# Patient Record
(demographics unavailable — no encounter records)

---

## 2024-11-14 NOTE — PHYSICAL EXAM
[JVD] : no jugular venous distention  [Normal Breath Sounds] : Normal breath sounds [No Rash or Lesion] : No rash or lesion [Alert] : alert [Calm] : calm [de-identified] : Soft, nontender, nondistended. Well healed lower midline laparotomy scar without appreciable ventral hernia on valsvalva. [de-identified] : Thin appearing elderly female in NAD [de-identified] : MMM [de-identified] : No lower extremity edema.

## 2024-11-14 NOTE — PHYSICAL EXAM
[JVD] : no jugular venous distention  [Normal Breath Sounds] : Normal breath sounds [No Rash or Lesion] : No rash or lesion [Alert] : alert [Calm] : calm [de-identified] : Soft, nontender, nondistended. Well healed lower midline laparotomy scar without appreciable ventral hernia on valsvalva. [de-identified] : Thin appearing elderly female in NAD [de-identified] : MMM [de-identified] : No lower extremity edema.

## 2024-11-14 NOTE — HISTORY OF PRESENT ILLNESS
[FreeTextEntry1] : 75F PMH COPD, HTN, recently s/p ALIF ([place]) who developed a postoperative SBO and submassive PE. She responded well to AC however had persistent partial obstructive symptomatology and ultimately underwent an open abdominal exploration and was found to have a chronically incarcerated left retroperitoneal internal hernia. For which, the hernia contents were reduced and ultimately resected. Primary anastomosis was performed, and she tolerated the procedure well. Reported losing significant amount of weight perioperatively. Patient advised need for more frequent high calorie meals throughout the day. Continue AC and follow up with vascular sugary and PCP for continued VTE management and consideration for IVC filter retrieval. Patient presents today for one month follow up. Has regained 10lbs, able to eat better, and mobility is improving. Continues to c/o chronic back pain.   PMH: COPD, HTN, SBO, PE PSHx: Lumbar spinal fusion ([place]), open SBR, IVC Filter Medications: Nifedipine 30 qd, Citalopram 30 qd, Oxycodone 10 (post op pain), Eliquis Allergies: NA Social Hx: Prior smoker >20 years ago, denies alcohol/rec drug use Last colonoscopy: NA Last EGD: NA

## 2024-11-14 NOTE — HISTORY OF PRESENT ILLNESS
[FreeTextEntry1] : 75F PMH COPD, HTN, recently s/p ALIF ([place]) who developed a postoperative SBO and submassive PE. She responded well to AC however had persistent partial obstructive symptomatology and ultimately underwent an open abdominal exploration and was found to have a chronically incarcerated left retroperitoneal internal hernia. For which, the hernia contents were reduced and ultimately resected. Primary anastomosis was performed, and she tolerated the procedure well. Reported losing significant amount of weight perioperatively. Patient advised need for more frequent high calorie meals throughout the day. Continue AC and follow up with vascular sugary and PCP for continued VTE management and consideration for IVC filter retrieval. Patient presents today for one month follow up. Has regained 10lbs, able to eat better, and mobility is improving. Continues to c/o chronic back pain.   PMH: COPD, HTN, SBO, PE PSHx: Lumbar spinal fusion ([place]), open SBR, IVC Filter Medications: Nifedipine 30 qd, Citalopram 30 qd, Oxycodone 10 (post op pain), Eliquis Allergies: NA Social Hx: Prior smoker >20 years ago, denies alcohol/rec drug use Last colonoscopy: NA Last EGD: NA

## 2024-11-14 NOTE — ASSESSMENT
[FreeTextEntry1] : 76F with a recent ALIF complicated by PE and SBO 2/2 internal hernia now s/p IVC filter placement, AC, and open internal hernia reduction and SBR, continuing to recover well.

## 2024-11-14 NOTE — PLAN
[TextEntry] : - Referred to primary care for ongoing primary management as pt has not seen a primary physician in quite some time. - Referred back to vascular surgery for evaluation of IVC filter and possible withdrawal. - Pt has an appointment with orthopedics to eval chronic back pain.  - RTC in 2-3 months for re-evaluation.

## 2024-11-14 NOTE — PHYSICAL EXAM
[JVD] : no jugular venous distention  [Normal Breath Sounds] : Normal breath sounds [No Rash or Lesion] : No rash or lesion [Alert] : alert [Calm] : calm [de-identified] : Soft, nontender, nondistended. Well healed lower midline laparotomy scar without appreciable ventral hernia on valsvalva. [de-identified] : Thin appearing elderly female in NAD [de-identified] : MMM [de-identified] : No lower extremity edema.

## 2024-11-14 NOTE — PHYSICAL EXAM
[JVD] : no jugular venous distention  [Normal Breath Sounds] : Normal breath sounds [No Rash or Lesion] : No rash or lesion [Alert] : alert [Calm] : calm [de-identified] : Soft, nontender, nondistended. Well healed lower midline laparotomy scar without appreciable ventral hernia on valsvalva. [de-identified] : Thin appearing elderly female in NAD [de-identified] : MMM [de-identified] : No lower extremity edema.

## 2024-11-14 NOTE — HISTORY OF PRESENT ILLNESS
[FreeTextEntry1] : 75F PMH COPD, HTN, recently s/p ALIF ([place]) who developed a postoperative SBO and submassive PE. She responded well to AC however had persistent partial obstructive symptomatology and ultimately underwent an open abdominal exploration and was found to have a chronically incarcerated left retroperitoneal internal hernia. For which, the hernia contents were reduced and ultimately resected. Primary anastomosis was performed, and she tolerated the procedure well. Reported losing significant amount of weight perioperatively. Patient advised need for more frequent high calorie meals throughout the day. Continue AC and follow up with vascular sugary and PCP for continued VTE management and consideration for IVC filter retrieval. Patient presents today for one month follow up. Has regained 10lbs, able to eat better, and mobility is improving. Continues to c/o chronic back pain.   PMH: COPD, HTN, SBO, PE PSHx: Lumbar spinal fusion ([place]), open SBR, IVC Filter Medications: Nifedipine 30 qd, Citalopram 30 qd, Oxycodone 10 (post op pain), Eliquis Allergies: NA Social Hx: Prior smoker >20 years ago, denies alcohol/rec drug use Last colonoscopy: NA Last EGD: NA

## 2024-11-14 NOTE — HISTORY OF PRESENT ILLNESS
[FreeTextEntry1] : 75F PMH COPD, HTN, recently s/p ALIF ([place]) who developed a postoperative SBO and submassive PE. She responded well to AC however had persistent partial obstructive symptomatology and ultimately underwent an open abdominal exploration and was found to have a chronically incarcerated left retroperitoneal internal hernia. For which, the hernia contents were reduced and ultimately resected. Primary anastomosis was performed, and she tolerated the procedure well. Reported losing significant amount of weight perioperatively. Patient advised need for more frequent high calorie meals throughout the day. Continue AC and follow up with vascular sugary and PCP for continued VTE management and consideration for IVC filter retrieval. Patient presents today for one month follow up. Has regained 10lbs, able to eat better, and mobility is improving. Continues to c/o chronic back pain.   PMH: COPD, HTN, SBO, PE PSHx: Lumbar spinal fusion ([place]), open SBR, IVC Filter Medications: Nifedipine 30 qd, Citalopram 30 qd, Oxycodone 10 (post op pain), Eliquis Allergies: NA Social Hx: Prior smoker >20 years ago, denies alcohol/rec drug use Last colonoscopy: NA Last EGD: NA

## 2024-11-26 NOTE — HISTORY OF PRESENT ILLNESS
[de-identified] : 76-year-old female presents as new patient for evaluation status post spinal surgery.  On May 9, 2024 she underwent a lumbar procedure with Dr. Lawler at [facility].  No operative reports available for this procedure.  She states that the postoperative recovery process was complicated by small bowel obstruction that required surgery after presenting to the emergency department at [facility].  She also developed a DVT for which an IVC filter was placed.  She states that she was informed this may have been due to a peritoneal injury at the time of the lumbar spine procedure.  She has been largely unable to follow-up for recommended postoperative visits and care including rehab given this complication and surgical recovery which required a stay in a nursing home.  She describes ongoing back pain and a sense of stiffness in the low back to make significant difficulty change position.  Also describes ongoing pain associated numbness tingling and weakness affecting the bilateral legs.  This seems to be worse on the left than the right side.

## 2024-11-26 NOTE — DISCUSSION/SUMMARY
[de-identified] : I reviewed the available imaging with the patient as well as my impression of her surgical procedure underwent as well as the recovery thus far.  She had several relevant questions with regards to her ongoing rehabilitation and symptoms in the setting of ongoing back pain and lower extremity symptoms.  I have recommended ongoing rehabilitation in the form of physical therapy as well as an MRI of the lumbar spine to assess for any ongoing stenosis that may be contributing to her lower extremity symptoms.  I have also encouraged her to seek follow-up with her treating surgeon as he is of course much more familiar with her care and the procedure thus far.  Regards to her IVC filter she is requested follow-up with vascular surgery to consider removal.  We will follow-up after the MRI is complete  I have spent greater than 60 minutes preparing to see the patient, collecting relevant history, performing a thorough history and physical examination, counseling the patient regarding my findings ordering the appropriate therapies and tests, communicating with other relevant healthcare professionals, documenting my encounter and coordinating care.

## 2024-11-26 NOTE — ASSESSMENT
[FreeTextEntry1] : 76-year-old female status post L2 to ilium anterior posterior fusion with decompression performed at an outside facility May 9, 2024 postoperative course complicated by small bowel obstruction status post resection as well as DVT

## 2024-11-26 NOTE — DISCUSSION/SUMMARY
[de-identified] : I reviewed the available imaging with the patient as well as my impression of her surgical procedure underwent as well as the recovery thus far.  She had several relevant questions with regards to her ongoing rehabilitation and symptoms in the setting of ongoing back pain and lower extremity symptoms.  I have recommended ongoing rehabilitation in the form of physical therapy as well as an MRI of the lumbar spine to assess for any ongoing stenosis that may be contributing to her lower extremity symptoms.  I have also encouraged her to seek follow-up with her treating surgeon as he is of course much more familiar with her care and the procedure thus far.  Regards to her IVC filter she is requested follow-up with vascular surgery to consider removal.  We will follow-up after the MRI is complete  I have spent greater than 60 minutes preparing to see the patient, collecting relevant history, performing a thorough history and physical examination, counseling the patient regarding my findings ordering the appropriate therapies and tests, communicating with other relevant healthcare professionals, documenting my encounter and coordinating care.

## 2024-11-26 NOTE — HISTORY OF PRESENT ILLNESS
[de-identified] : 76-year-old female presents as new patient for evaluation status post spinal surgery.  On May 9, 2024 she underwent a lumbar procedure with Dr. Lawler at [facility].  No operative reports available for this procedure.  She states that the postoperative recovery process was complicated by small bowel obstruction that required surgery after presenting to the emergency department at [facility] [place].  She also developed a DVT for which an IVC filter was placed.  She states that she was informed this may have been due to a peritoneal injury at the time of the lumbar spine procedure.  She has been largely unable to follow-up for recommended postoperative visits and care including rehab given this complication and surgical recovery which required a stay in a nursing home.  She describes ongoing back pain and a sense of stiffness in the low back to make significant difficulty change position.  Also describes ongoing pain associated numbness tingling and weakness affecting the bilateral legs.  This seems to be worse on the left than the right side.

## 2024-11-26 NOTE — PHYSICAL EXAM
[Antalgic] : antalgic [Walker] : ambulates with walker [Normal DTR Reflexes] : DTR reflexes normal [Normal Proprioception] : sensation intact for proprioception [Normal] : Oriented to person, place, and time, insight and judgement were intact and the affect was normal [de-identified] : Globally 4+ out of 5 in the bilateral lower extremities [de-identified] : AP lateral scoliosis x-ray PACS 11/25/2024 CT lumbar spine [facility] July 2024 Status post L2 to ilium instrumented spinal fusion with L4-5 and L5-S1 ALIF.  Global sagittal and coronal alignment parameters are appropriate and all hardware appears to be an appropriately placed position Laminectomies performed at L2-3 L3-4 L4-5 and L5-S1

## 2024-11-26 NOTE — PHYSICAL EXAM
[Antalgic] : antalgic [Walker] : ambulates with walker [Normal DTR Reflexes] : DTR reflexes normal [Normal Proprioception] : sensation intact for proprioception [Normal] : Oriented to person, place, and time, insight and judgement were intact and the affect was normal [de-identified] : Globally 4+ out of 5 in the bilateral lower extremities [de-identified] : AP lateral scoliosis x-ray PACS 11/25/2024 CT lumbar spine [facility] July 2024 Status post L2 to ilium instrumented spinal fusion with L4-5 and L5-S1 ALIF.  Global sagittal and coronal alignment parameters are appropriate and all hardware appears to be an appropriately placed position Laminectomies performed at L2-3 L3-4 L4-5 and L5-S1

## 2024-12-18 NOTE — HISTORY OF PRESENT ILLNESS
[FreeTextEntry1] : 76yoF with COPD (not on home O2), HTN, anxiety/depression who was admitted on 7/5/24 for management of SBO and bilateral pulmonary embolism. Patient was not a candidate for anticoagulation at that time and vascular surgery were asked to place IVC filter that was done on 7/10/24. Today she returns for a follow up and to discuss a removal of IVCF.   Venous doppler 7/6/24:  Deep venous thrombosis of a RIGHT peroneal vein. Deep venous thrombosis of the LEFT common femoral and deep femoral veins

## 2024-12-18 NOTE — ASSESSMENT
[FreeTextEntry1] : 76yoF who presented with bilateral PE and venous thromboembolism with need for undergoing a laparotomy with General Surgery.  Given her history and relative contraindication to anticoagulation in the perioperative period, she was advised to undergo placement of an IVC filter that was done 7/10/24.  Today she returns to discuss a removal of IVCF and to f/u on her DVTs.

## 2024-12-18 NOTE — ADDENDUM
[FreeTextEntry1] : This note was written by Florence BANKS, acting as a scribe for Dr. Fredrick Woodall.  I, Dr. Fredrick Woodall, have read and attest that all the information, medical decision-making, and discharge instructions within are true and accurate.  I, Dr. Fredrick Woodall, personally performed the evaluation and management (E/M) services for this new patient.  That E/M includes conducting the initial examination, assessing all conditions, and establishing the plan of care.  Today, my ACP, Florence BANKS, was here to observe my evaluation and management services for this patient to be followed going forward.

## 2024-12-18 NOTE — PHYSICAL EXAM
[Respiratory Effort] : normal respiratory effort [Normal Heart Sounds] : normal heart sounds [2+] : left 2+ [Abdomen Tenderness] : ~T ~M No abdominal tenderness [Alert] : alert [Calm] : calm [de-identified] : WN/WD, NAD [de-identified] : NC/AT [de-identified] : supple [de-identified] : FROM

## 2025-01-30 NOTE — HISTORY OF PRESENT ILLNESS
[FreeTextEntry1] : 76yoF with COPD (not on home O2), HTN, anxiety/depression who was admitted on 7/5/24 for management of SBO and bilateral pulmonary embolism. Patient was not a candidate for anticoagulation at that time and vascular surgery were asked to place IVC filter that was done on 7/10/24. Today she returns for a follow up and to discuss a removal of IVCF. She is on Eliquis.   Venous doppler 7/6/24:  Deep venous thrombosis of a RIGHT peroneal vein. Deep venous thrombosis of the LEFT common femoral and deep femoral veins

## 2025-01-31 NOTE — ASSESSMENT
[FreeTextEntry1] : 76yoF who presented with bilateral PE and venous thromboembolism with need for undergoing a laparotomy with General Surgery.  Given her history and relative contraindication to anticoagulation in the perioperative period, she was advised to undergo placement of an IVC filter that was done 7/10/24.  Today she returns to discuss a removal of IVCF and to f/u on her DVTs. [Arterial/Venous Disease] : arterial/venous disease

## 2025-01-31 NOTE — PHYSICAL EXAM
[Abdomen Tenderness] : ~T ~M No abdominal tenderness [de-identified] : WN/WD, NAD [de-identified] : supple [de-identified] : NC/AT [de-identified] : FROM [2+] : left 2+ [No Rash or Lesion] : No rash or lesion

## 2025-01-31 NOTE — PHYSICAL EXAM
[Abdomen Tenderness] : ~T ~M No abdominal tenderness [de-identified] : WN/WD, NAD [de-identified] : supple [de-identified] : NC/AT [de-identified] : FROM [2+] : left 2+ [No Rash or Lesion] : No rash or lesion

## 2025-01-31 NOTE — PHYSICAL EXAM
[Abdomen Tenderness] : ~T ~M No abdominal tenderness [de-identified] : WN/WD, NAD [de-identified] : NC/AT [de-identified] : supple [de-identified] : FROM [2+] : left 2+ [No Rash or Lesion] : No rash or lesion

## 2025-01-31 NOTE — PHYSICAL EXAM
[Abdomen Tenderness] : ~T ~M No abdominal tenderness [de-identified] : WN/WD, NAD [de-identified] : NC/AT [de-identified] : supple [de-identified] : FROM [2+] : left 2+ [No Rash or Lesion] : No rash or lesion

## 2025-02-16 NOTE — PHYSICAL EXAM
[de-identified] : AP lateral scoliosis x-ray PACS 11/25/2024 CT lumbar spine [facility] July 2024 Status post L2 to ilium instrumented spinal fusion with L4-5 and L5-S1 ALIF.  Global sagittal and coronal alignment parameters are appropriate and all hardware appears to be an appropriately placed position Laminectomies performed at L2-3 L3-4 L4-5 and L5-S1  MRI lumbar spine 12/20/2024 [facility] Status post L2 to ilium instrumented fusion.  Prior decompression L3-4 and L4-5 without residual central stenosis.  Within the limits of the MRI scan with metal artifact that does not appear to be a significant ongoing foraminal stenosis

## 2025-02-16 NOTE — DISCUSSION/SUMMARY
[de-identified] : I reviewed the imaging via phone call today with the patient.  I do not find any radiographic evidence of ongoing nerve compression or correlate with her ongoing symptoms.  I do feel that this may be muscular given the extent of the spinal reconstruction in general and have recommended a course of physical therapy as able with her present illness to treat the pain in the legs as well as ongoing follow-up with her treating surgeon as previously recommended.  She will keep us informed her progress   I have spent greater than 20 minutes preparing to see the patient, collecting relevant history, performing a thorough history and physical examination, counseling the patient regarding my findings ordering the appropriate therapies and tests, communicating with other relevant healthcare professionals, documenting my encounter and coordinating care.

## 2025-02-16 NOTE — HISTORY OF PRESENT ILLNESS
[de-identified] : I spoke to the patient on the phone to review the results of the recently completed lumbar spine MRI.  She had a recent bout of the flu for several weeks has been very ill.  Still has ongoing pain in the low back with occasional cramping aches in the Hamstrings

## 2025-07-28 NOTE — ASSESSMENT
[FreeTextEntry1] : 76 YO F with ACC/AHA Stage C NICM (LVIDd 5cm, LVEF 25%),HTN, COPD (Former tobacco user), PE 7/2024 s/p IVC filter and on eliquis, spinal fusion surgery, and SBO s/p ex-lap/JESS/SBR, who presented with dyspnea and found to have COPD exacerbation with new LV dysfunction. Presents to cardiomyopathy clinic for further evaluation.   She is NYHA Class III but euvolemic/ hypovolemic on exam with marginal blood pressures. I suspect deconditioning and series of surgeries is contributing largely to her poor functional status.   # Acute systolic heart failure - Etiology: NICM, unclear TIC? , would discuss further pursuit of additional imaging  - GDMT: current regimen includes Entresto 49/51 mg BID, spironolactone 25 mg daily, metoprolol succinate 25 mg and Farxiga 10 mg daily. Today given her marginal pressures and weakness, I asked her to HOLD her evening dose of Entresto and tomorrow if her SBP >95 mmHg, she can RESUME Entresto at a lower dose of 24/26 mg BID.  - Diuretics: none. Encourage po hydration up to but not more than 2 L daily. Sent BP cuff to [facility] to allow for home monitoring  - Device: premature - Labs at discharge 7/9: Na 141, K 3.9, BUN 28, Cr 0.85, proBNP 5839 (on admission) - Repeat Labs 7/14: Na 146, K 4.4, BUN 18, Cr 0.76, proBNP now 1304 - Other: cardiac rehab deferred , will discuss at follow up  # COPD  - Following w/ pulm at [facility]/ Dr. England - Pulse Ox 98% on RA  #Fungal mycetoma.  -diagnosed with Mycetoma on CT chest in 4/2025.  -Repeat CT chest in 05/2025 showed a mild increase in the cavitary lesion. 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  - Pt evaluated by her Pulmonologists and ID.  - Given stability of lesion on interval CT scans- No antifungals were started. No need for Bronchoscopy given no new symptoms    #History of pulmonary embolus (PE).  -Hx B/L PE summer 2024, on Eliquis, s/p ivc filter which was removed in April 2025.  - AC: Eliquis reduced from 5mg BID to prophylactic 2.5mg BID as per vasc - CTA negative of PE.  #Hypotension/ weakness - Relatively asymptomatic  - changes as above - H/H on discharge 14.7/ 46.6    Please follow up in person with HF ACP team given hypotension. And plan for subsequent visits to be via telephone. Will plan to ultimately see Dr. Oliva in 3 months on max doses of GDMT.

## 2025-07-28 NOTE — ASSESSMENT
[FreeTextEntry1] : 78 YO F with ACC/AHA Stage C NICM (LVIDd 5cm, LVEF 25%),HTN, COPD (Former tobacco user), PE 7/2024 s/p IVC filter and on eliquis, spinal fusion surgery, and SBO s/p ex-lap/JESS/SBR, who presented with dyspnea and found to have COPD exacerbation with new LV dysfunction. Presents to cardiomyopathy clinic for further evaluation.   She is NYHA Class III but euvolemic/ hypovolemic on exam with marginal blood pressures. I suspect deconditioning and series of surgeries is contributing largely to her poor functional status.   # Acute systolic heart failure - Etiology: NICM, unclear TIC? , would discuss further pursuit of additional imaging  - GDMT: current regimen includes Entresto 49/51 mg BID, spironolactone 25 mg daily, metoprolol succinate 25 mg and Farxiga 10 mg daily. Today given her marginal pressures and weakness, I asked her to HOLD her evening dose of Entresto and tomorrow if her SBP >95 mmHg, she can RESUME Entresto at a lower dose of 24/26 mg BID.  - Diuretics: none. Encourage po hydration up to but not more than 2 L daily. Sent BP cuff to [facility] to allow for home monitoring  - Device: premature - Labs at discharge 7/9: Na 141, K 3.9, BUN 28, Cr 0.85, proBNP 5839 (on admission) - Repeat Labs 7/14: Na 146, K 4.4, BUN 18, Cr 0.76, proBNP now 1304 - Other: cardiac rehab deferred , will discuss at follow up  # COPD  - Following w/ pulm at [facility]/ Dr. England - Pulse Ox 98% on RA  #Fungal mycetoma.  -diagnosed with Mycetoma on CT chest in 4/2025.  -Repeat CT chest in 05/2025 showed a mild increase in the cavitary lesion. 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  - Pt evaluated by her Pulmonologists and ID.  - Given stability of lesion on interval CT scans- No antifungals were started. No need for Bronchoscopy given no new symptoms    #History of pulmonary embolus (PE).  -Hx B/L PE summer 2024, on Eliquis, s/p ivc filter which was removed in April 2025.  - AC: Eliquis reduced from 5mg BID to prophylactic 2.5mg BID as per vasc - CTA negative of PE.  #Hypotension/ weakness - Relatively asymptomatic  - changes as above - H/H on discharge 14.7/ 46.6    Please follow up in person with HF ACP team given hypotension. And plan for subsequent visits to be via telephone. Will plan to ultimately see Dr. Oliva in 3 months on max doses of GDMT.

## 2025-07-28 NOTE — REVIEW OF SYSTEMS
[FreeTextEntry1] :  The remaining 14-point ROS is otherwise negative or non contributory except as noted in the HPI.

## 2025-07-28 NOTE — CARDIOLOGY SUMMARY
[de-identified] :  EKG: SR with T wave inversions in anterior leads   [de-identified] :  Pharm Stress MPI 3/2/2023: EKG stress no ST changes, nuclear stress with moederate sized, mild severity, predominantly reversible inferior defect  [de-identified] : TTE 7/2025: LV 5.0 cm, LVEF 25% with global hypokinesis, normal RV size/function, LVOT VTI 14 cm, no significant valvular abnormalities, unable to measure PASP, RAP ~ 8mmHg   [de-identified] : CCTA 7/8/25: Ca score 0, normal coronary arteries, minimal aortic root calcification    REVIEW OF STUDIES EKG: SR with T wave inversions in anterior leads  TTE 7/2025: LV 5.0 cm, LVEF 25% with global hypokinesis, normal RV size/function, LVOT VTI 14 cm, no significant valvular abnormalities, unable to measure PASP, RAP ~ 8mmHg  CCTA 7/8/25: Ca score 0, normal coronary arteries, minimal aortic root calcification  Pharm Stress MPI 3/2/2023: EKG stress no ST changes, nuclear stress with moederate sized, mild severity, predominantly reversible inferior defect   REVIEW OF STUDIES EKG: SR with T wave inversions in anterior leads  TTE 7/2025: LV 5.0 cm, LVEF 25% with global hypokinesis, normal RV size/function, LVOT VTI 14 cm, no significant valvular abnormalities, unable to measure PASP, RAP ~ 8mmHg  CCTA 7/8/25: Ca score 0, normal coronary arteries, minimal aortic root calcification  Pharm Stress MPI 3/2/2023: EKG stress no ST changes, nuclear stress with moederate sized, mild severity, predominantly reversible inferior defect

## 2025-07-28 NOTE — PHYSICAL EXAM
[No Xanthelasma] : no xanthelasma [Normal] : soft, non-tender, no masses/organomegaly, normal bowel sounds [Normal Gait] : normal gait [No Edema] : no edema [No Cyanosis] : no cyanosis [No Clubbing] : no clubbing [Moves all extremities] : moves all extremities [Alert and Oriented] : alert and oriented [de-identified] : thin appearing  [de-identified] : MMM, no perioral cyanosis  [de-identified] : JVP < 6 cm of H2O [de-identified] : using a walker for distance  [de-identified] : warm peripherally

## 2025-07-28 NOTE — ASSESSMENT
[FreeTextEntry1] : 76 YO F with ACC/AHA Stage C NICM (LVIDd 5cm, LVEF 25%),HTN, COPD (Former tobacco user), PE 7/2024 s/p IVC filter and on eliquis, spinal fusion surgery, and SBO s/p ex-lap/JESS/SBR, who presented with dyspnea and found to have COPD exacerbation with new LV dysfunction. Presents to cardiomyopathy clinic for further evaluation.   She is NYHA Class III but euvolemic/ hypovolemic on exam with marginal blood pressures. I suspect deconditioning and series of surgeries is contributing largely to her poor functional status.   # Acute systolic heart failure - Etiology: NICM, unclear TIC? , would discuss further pursuit of additional imaging  - GDMT: current regimen includes Entresto 49/51 mg BID, spironolactone 25 mg daily, metoprolol succinate 25 mg and Farxiga 10 mg daily. Today given her marginal pressures and weakness, I asked her to HOLD her evening dose of Entresto and tomorrow if her SBP >95 mmHg, she can RESUME Entresto at a lower dose of 24/26 mg BID.  - Diuretics: none. Encourage po hydration up to but not more than 2 L daily. Sent BP cuff to [facility] to allow for home monitoring  - Device: premature - Labs at discharge 7/9: Na 141, K 3.9, BUN 28, Cr 0.85, proBNP 5839 (on admission) - Repeat Labs 7/14: Na 146, K 4.4, BUN 18, Cr 0.76, proBNP now 1304 - Other: cardiac rehab deferred , will discuss at follow up  # COPD  - Following w/ pulm at [facility]/ Dr. England - Pulse Ox 98% on RA  #Fungal mycetoma.  -diagnosed with Mycetoma on CT chest in 4/2025.  -Repeat CT chest in 05/2025 showed a mild increase in the cavitary lesion. 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  - Pt evaluated by her Pulmonologists and ID.  - Given stability of lesion on interval CT scans- No antifungals were started. No need for Bronchoscopy given no new symptoms    #History of pulmonary embolus (PE).  -Hx B/L PE summer 2024, on Eliquis, s/p ivc filter which was removed in April 2025.  - AC: Eliquis reduced from 5mg BID to prophylactic 2.5mg BID as per vasc - CTA negative of PE.  #Hypotension/ weakness - Relatively asymptomatic  - changes as above - H/H on discharge 14.7/ 46.6    Please follow up in person with HF ACP team given hypotension. And plan for subsequent visits to be via telephone. Will plan to ultimately see Dr. Oliva in 3 months on max doses of GDMT.

## 2025-07-28 NOTE — PHYSICAL EXAM
[No Xanthelasma] : no xanthelasma [Normal] : soft, non-tender, no masses/organomegaly, normal bowel sounds [Normal Gait] : normal gait [No Edema] : no edema [No Cyanosis] : no cyanosis [No Clubbing] : no clubbing [Moves all extremities] : moves all extremities [Alert and Oriented] : alert and oriented [de-identified] : thin appearing  [de-identified] : MMM, no perioral cyanosis  [de-identified] : JVP < 6 cm of H2O [de-identified] : using a walker for distance  [de-identified] : warm peripherally

## 2025-07-28 NOTE — HISTORY OF PRESENT ILLNESS
[FreeTextEntry1] : HF- Dr. Oliva   76 YO F with ACC/AHA Stage C NICM (LVIDd 5cm, LVEF 25%),HTN, COPD (Former tobacco user), PE 7/2024 s/p IVC filter and on eliquis, spinal fusion surgery, and SBO s/p ex-lap/JESS/SBR, who presented with dyspnea and found to have COPD exacerbation with new LV dysfunction. Presents to cardiomyopathy clinic for further evaluation.   Admitted to [facility] 7/2-7/9/2025. Presenting with worsening shortness of breath. Initial evaluation by CTPE negative but showed new 1.8cm irregular nodular opacity but deemed stable by pulmonology. TTE confirmed newly reduced TTE w/ LVEF 25% and subsequent ischemic evaluation by CCTA was normal.   She lives on the [facility], her son brought her to her appt today. She is using a walker today since her spinal fusion for balance. While she did have pre-existing hypertension prior to this admission, she felt it was well controlled on amlodipine, but since her series of admissions has noted elevated heart rates over the course of the year.  Overall, she reports she is very fatigued and her appetite poor. She estimates she can walk 5 blocks before fatiguing, not short of breath. Sometimes the hot weather, as its been unusually warm in [place] can affect her breathing but has some relief from her MDI. She sleeps with 2-3 pillows for years for her "COPD" and has never been able to lie flat, no reports of PND. And also takes Ambien to help sleep Maintenace. She has no recent LE.  Appetite is poor and plans to start ensure,  but admits to early prandial fullness and nausea without vomiting. She estimates seh drinks < 1 L. No hx of carpel tunnel.

## 2025-07-28 NOTE — PHYSICAL EXAM
[No Xanthelasma] : no xanthelasma [Normal] : soft, non-tender, no masses/organomegaly, normal bowel sounds [Normal Gait] : normal gait [No Edema] : no edema [No Cyanosis] : no cyanosis [No Clubbing] : no clubbing [Moves all extremities] : moves all extremities [Alert and Oriented] : alert and oriented [de-identified] : thin appearing  [de-identified] : MMM, no perioral cyanosis  [de-identified] : JVP < 6 cm of H2O [de-identified] : using a walker for distance  [de-identified] : warm peripherally

## 2025-07-28 NOTE — CARDIOLOGY SUMMARY
[de-identified] :  EKG: SR with T wave inversions in anterior leads   [de-identified] :  Pharm Stress MPI 3/2/2023: EKG stress no ST changes, nuclear stress with moederate sized, mild severity, predominantly reversible inferior defect  [de-identified] : TTE 7/2025: LV 5.0 cm, LVEF 25% with global hypokinesis, normal RV size/function, LVOT VTI 14 cm, no significant valvular abnormalities, unable to measure PASP, RAP ~ 8mmHg   [de-identified] : CCTA 7/8/25: Ca score 0, normal coronary arteries, minimal aortic root calcification    REVIEW OF STUDIES EKG: SR with T wave inversions in anterior leads  TTE 7/2025: LV 5.0 cm, LVEF 25% with global hypokinesis, normal RV size/function, LVOT VTI 14 cm, no significant valvular abnormalities, unable to measure PASP, RAP ~ 8mmHg  CCTA 7/8/25: Ca score 0, normal coronary arteries, minimal aortic root calcification  Pharm Stress MPI 3/2/2023: EKG stress no ST changes, nuclear stress with moederate sized, mild severity, predominantly reversible inferior defect   REVIEW OF STUDIES EKG: SR with T wave inversions in anterior leads  TTE 7/2025: LV 5.0 cm, LVEF 25% with global hypokinesis, normal RV size/function, LVOT VTI 14 cm, no significant valvular abnormalities, unable to measure PASP, RAP ~ 8mmHg  CCTA 7/8/25: Ca score 0, normal coronary arteries, minimal aortic root calcification  Pharm Stress MPI 3/2/2023: EKG stress no ST changes, nuclear stress with moederate sized, mild severity, predominantly reversible inferior defect

## 2025-07-28 NOTE — CARDIOLOGY SUMMARY
[de-identified] :  EKG: SR with T wave inversions in anterior leads   [de-identified] :  Pharm Stress MPI 3/2/2023: EKG stress no ST changes, nuclear stress with moederate sized, mild severity, predominantly reversible inferior defect  [de-identified] : TTE 7/2025: LV 5.0 cm, LVEF 25% with global hypokinesis, normal RV size/function, LVOT VTI 14 cm, no significant valvular abnormalities, unable to measure PASP, RAP ~ 8mmHg   [de-identified] : CCTA 7/8/25: Ca score 0, normal coronary arteries, minimal aortic root calcification    REVIEW OF STUDIES EKG: SR with T wave inversions in anterior leads  TTE 7/2025: LV 5.0 cm, LVEF 25% with global hypokinesis, normal RV size/function, LVOT VTI 14 cm, no significant valvular abnormalities, unable to measure PASP, RAP ~ 8mmHg  CCTA 7/8/25: Ca score 0, normal coronary arteries, minimal aortic root calcification  Pharm Stress MPI 3/2/2023: EKG stress no ST changes, nuclear stress with moederate sized, mild severity, predominantly reversible inferior defect   REVIEW OF STUDIES EKG: SR with T wave inversions in anterior leads  TTE 7/2025: LV 5.0 cm, LVEF 25% with global hypokinesis, normal RV size/function, LVOT VTI 14 cm, no significant valvular abnormalities, unable to measure PASP, RAP ~ 8mmHg  CCTA 7/8/25: Ca score 0, normal coronary arteries, minimal aortic root calcification  Pharm Stress MPI 3/2/2023: EKG stress no ST changes, nuclear stress with moederate sized, mild severity, predominantly reversible inferior defect

## 2025-07-28 NOTE — HISTORY OF PRESENT ILLNESS
[FreeTextEntry1] : HF- Dr. Oliva   78 YO F with ACC/AHA Stage C NICM (LVIDd 5cm, LVEF 25%),HTN, COPD (Former tobacco user), PE 7/2024 s/p IVC filter and on eliquis, spinal fusion surgery, and SBO s/p ex-lap/JESS/SBR, who presented with dyspnea and found to have COPD exacerbation with new LV dysfunction. Presents to cardiomyopathy clinic for further evaluation.   Admitted to [facility] 7/2-7/9/2025. Presenting with worsening shortness of breath. Initial evaluation by CTPE negative but showed new 1.8cm irregular nodular opacity but deemed stable by pulmonology. TTE confirmed newly reduced TTE w/ LVEF 25% and subsequent ischemic evaluation by CCTA was normal.   She lives on the [facility], her son brought her to her appt today. She is using a walker today since her spinal fusion for balance. While she did have pre-existing hypertension prior to this admission, she felt it was well controlled on amlodipine, but since her series of admissions has noted elevated heart rates over the course of the year.  Overall, she reports she is very fatigued and her appetite poor. She estimates she can walk 5 blocks before fatiguing, not short of breath. Sometimes the hot weather, as its been unusually warm in [place] can affect her breathing but has some relief from her MDI. She sleeps with 2-3 pillows for years for her "COPD" and has never been able to lie flat, no reports of PND. And also takes Ambien to help sleep Maintenace. She has no recent LE.  Appetite is poor and plans to start ensure,  but admits to early prandial fullness and nausea without vomiting. She estimates seh drinks < 1 L. No hx of carpel tunnel.

## 2025-07-28 NOTE — FAMILY HISTORY
[TextEntry] : Maternal Grandmother: CHF 80s Sister: CAD,  65 Mother: Ovarian CA  Brother: CAD s/p Stent  Children: Healthy (1 son)

## 2025-07-28 NOTE — HISTORY OF PRESENT ILLNESS
[FreeTextEntry1] : HF- Dr. Oliva   78 YO F with ACC/AHA Stage C NICM (LVIDd 5cm, LVEF 25%),HTN, COPD (Former tobacco user), PE 7/2024 s/p IVC filter and on eliquis, spinal fusion surgery, and SBO s/p ex-lap/JESS/SBR, who presented with dyspnea and found to have COPD exacerbation with new LV dysfunction. Presents to cardiomyopathy clinic for further evaluation.   Admitted to [facility] 7/2-7/9/2025. Presenting with worsening shortness of breath. Initial evaluation by CTPE negative but showed new 1.8cm irregular nodular opacity but deemed stable by pulmonology. TTE confirmed newly reduced TTE w/ LVEF 25% and subsequent ischemic evaluation by CCTA was normal.   She lives on the [facility], her son brought her to her appt today. She is using a walker today since her spinal fusion for balance. While she did have pre-existing hypertension prior to this admission, she felt it was well controlled on amlodipine, but since her series of admissions has noted elevated heart rates over the course of the year.  Overall, she reports she is very fatigued and her appetite poor. She estimates she can walk 5 blocks before fatiguing, not short of breath. Sometimes the hot weather, as its been unusually warm in [place] can affect her breathing but has some relief from her MDI. She sleeps with 2-3 pillows for years for her "COPD" and has never been able to lie flat, no reports of PND. And also takes Ambien to help sleep Maintenace. She has no recent LE.  Appetite is poor and plans to start ensure,  but admits to early prandial fullness and nausea without vomiting. She estimates seh drinks < 1 L. No hx of carpel tunnel.